# Patient Record
Sex: FEMALE | Race: ASIAN
[De-identification: names, ages, dates, MRNs, and addresses within clinical notes are randomized per-mention and may not be internally consistent; named-entity substitution may affect disease eponyms.]

---

## 2017-05-15 ENCOUNTER — HOSPITAL ENCOUNTER (OUTPATIENT)
Dept: HOSPITAL 75 - RAD | Age: 52
End: 2017-05-15
Attending: INTERNAL MEDICINE
Payer: COMMERCIAL

## 2017-05-15 DIAGNOSIS — R92.8: Primary | ICD-10-CM

## 2017-05-15 PROCEDURE — 77066 DX MAMMO INCL CAD BI: CPT

## 2017-05-15 NOTE — DIAGNOSTIC IMAGING REPORT
Bilateral diagnostic mammogram.



The current study was also evaluated with a Computer Aided

Detection (CAD) system.



INDICATION: Follow-up, right breast calcifications.



COMPARISON: 10/19/2016.



FINDINGS: The calcifications in the upper-outer aspect of the

right breast appear to be questionably increased from the prior

exam. This could be related to better visualization of portion of

these calcifications and not definitely related to worsening.



Otherwise, the heterogeneously dense parenchyma which may

decrease mammographic sensitivity is again noted in both breasts

with no adverse development.



IMPRESSION: Questionable increase in the calcification group in

the upper-outer aspect of the right breast. Close follow-up with

six-month right breast mammogram is recommended.



ACR BI-RADS Category 3: Probably benign findings.

Result letter will be mailed to the patient.

Note: At least 10% of breast cancer is not imaged by mammography.



Dictated by: 



  Dictated on workstation # [id number]

## 2017-11-27 ENCOUNTER — HOSPITAL ENCOUNTER (OUTPATIENT)
Dept: HOSPITAL 75 - RAD | Age: 52
End: 2017-11-27
Attending: NURSE PRACTITIONER
Payer: COMMERCIAL

## 2017-11-27 DIAGNOSIS — R92.1: Primary | ICD-10-CM

## 2017-11-27 NOTE — DIAGNOSTIC IMAGING REPORT
Right breast diagnostic mammogram with tomography evaluation.



The current study was also evaluated with a Computer Aided

Detection (CAD) system.



COMPARISON: 05/15/2017.



INDICATION: Right breast calcifications, six month followup.



FINDINGS:

The previously seen upper-outer anterior breast calcifications

are not changed from the previous exam with no adverse

development.



Background dense parenchyma is seen. No suspicious change from

the prior studies is noted. Circumscribed stable nodule in the

medial inferior aspect of the right breast is again noted.



IMPRESSION: Stable calcifications in the upper-outer aspect of

the right breast, likely benign. Another followup in six months

when the patient is due for her bilateral mammograms is

recommended.



ACR BI-RADS Category 3: Probably benign findings.

Result letter will be mailed to the patient.

Note: At least 10% of breast cancer is not imaged by mammography.



Dictated by: 



  Dictated on workstation # [id number]

## 2018-05-10 ENCOUNTER — HOSPITAL ENCOUNTER (OUTPATIENT)
Dept: HOSPITAL 75 - RAD | Age: 53
End: 2018-05-10
Attending: INTERNAL MEDICINE
Payer: COMMERCIAL

## 2018-05-10 DIAGNOSIS — R92.1: Primary | ICD-10-CM

## 2018-05-10 PROCEDURE — 77066 DX MAMMO INCL CAD BI: CPT

## 2018-05-10 NOTE — DIAGNOSTIC IMAGING REPORT
INDICATION: Digital mammogram bilateral diagnostic with 3-D

tomosynthesis.



This study was compared to the prior exams of 11/27/17, 5/15/17,

10/19/16 and 3/23/16. At this time, there are no current

complaints.



The current study was also evaluated with a Computer Aided

Detection (CAD) system.



FINDINGS: The previous diagnostic mammogram of the right breast

performed on 11/27/17 noted stable calcifications in the

upper-outer aspect of the right breast. Those calcifications are

again evident on this study and do not seem to have changed

significantly. I do suspect that these calcifications are benign.

As they have not changed significantly since the 10/19/16 exam, I

do feel it is safe for the patient to return to a regular

screening mammogram schedule.



The fibroglandular tissue in both breasts is dense. This does

limit the sensitivity of this study. Overall, there does not

appear to have been any adverse change. There is no primary or

secondary sign of malignancy noted.



IMPRESSION:

1. The calcifications in the right breast appear stable. Most

likely, they are benign.

2. There is no evidence of malignancy involving either breast.

3. The patient should have her annual bilateral screening

mammogram on schedule in May 2019.



ACR BI-RADS Category 2: Benign findings.

Result letter will be mailed to the patient.

Note: At least 10% of breast cancer is not imaged by mammography.



Dictated by: 



  Dictated on workstation # [id number]

## 2018-11-09 ENCOUNTER — HOSPITAL ENCOUNTER (OUTPATIENT)
Dept: HOSPITAL 75 - LAB | Age: 53
End: 2018-11-09
Attending: INTERNAL MEDICINE
Payer: COMMERCIAL

## 2018-11-09 DIAGNOSIS — B48.8: Primary | ICD-10-CM

## 2018-11-09 PROCEDURE — 87220 TISSUE EXAM FOR FUNGI: CPT

## 2019-07-26 ENCOUNTER — HOSPITAL ENCOUNTER (OUTPATIENT)
Dept: HOSPITAL 75 - RAD | Age: 54
End: 2019-07-26
Attending: INTERNAL MEDICINE
Payer: COMMERCIAL

## 2019-07-26 DIAGNOSIS — D68.51: ICD-10-CM

## 2019-07-26 DIAGNOSIS — I10: ICD-10-CM

## 2019-07-26 DIAGNOSIS — R73.09: ICD-10-CM

## 2019-07-26 DIAGNOSIS — E03.8: ICD-10-CM

## 2019-07-26 DIAGNOSIS — Z12.31: Primary | ICD-10-CM

## 2019-07-26 DIAGNOSIS — E53.8: ICD-10-CM

## 2019-07-26 PROCEDURE — 77067 SCR MAMMO BI INCL CAD: CPT

## 2019-07-26 NOTE — DIAGNOSTIC IMAGING REPORT
INDICATION: Screening



The current study was also evaluated with a Computer Aided

Detection (CAD) system. 3-D Tomographic imaging was also

performed.



COMPARISON: Comparison is made with prior examinations of

05/10/2018, 11/27/2017, and 05/15/2017.



FINDINGS: A few benign-type calcifications. There is no dominant

mass, spiculated lesion, or suspicious calcification identified.

The skin, nipples, and axillae are unremarkable.



IMPRESSION: Benign.



ACR BI-RADS Category 2: Benign findings.

Result letter will be mailed to the patient.

Note: At least 10% of breast cancer is not imaged by mammography.



Dictated by: 



  Dictated on workstation # [id number]

## 2020-08-27 ENCOUNTER — HOSPITAL ENCOUNTER (OUTPATIENT)
Dept: HOSPITAL 75 - RAD | Age: 55
End: 2020-08-27
Attending: INTERNAL MEDICINE
Payer: COMMERCIAL

## 2020-08-27 DIAGNOSIS — Z12.31: Primary | ICD-10-CM

## 2020-08-27 PROCEDURE — 77063 BREAST TOMOSYNTHESIS BI: CPT

## 2020-08-27 PROCEDURE — 77067 SCR MAMMO BI INCL CAD: CPT

## 2020-08-28 NOTE — DIAGNOSTIC IMAGING REPORT
INDICATION: Routine screening.



COMPARISON is made with prior mammograms from 7/26/2019 and

5/10/2018.



2-D and 3-D bilateral screening mammography was performed with

CAD.



Both breasts are heterogeneously dense, limiting the sensitivity

of mammography. Nodular density in the lower right breast is

stable. No new mass or malignant appearing microcalcifications

are seen. Axillae are unremarkable.



IMPRESSION: BI-RADS Category 2.



No mammographic features suspicious for malignancy are

identified.



ACR BI-RADS Category 2: Benign findings.

Result letter will be mailed to the patient.

Note: At least 10% of breast cancer is not imaged by mammography.



Dictated by: 



  Dictated on workstation # [id number]

## 2021-09-03 ENCOUNTER — HOSPITAL ENCOUNTER (OUTPATIENT)
Dept: HOSPITAL 75 - RAD | Age: 56
End: 2021-09-03
Attending: NURSE PRACTITIONER
Payer: COMMERCIAL

## 2021-09-03 DIAGNOSIS — Z12.31: Primary | ICD-10-CM

## 2021-09-03 PROCEDURE — 77067 SCR MAMMO BI INCL CAD: CPT

## 2021-09-03 PROCEDURE — 77063 BREAST TOMOSYNTHESIS BI: CPT

## 2021-09-03 NOTE — DIAGNOSTIC IMAGING REPORT
INDICATION: Routine screening.



COMPARISON is made with prior mammograms 08/27/2020 and

7/26/2019.



2-D and 3-D bilateral screening mammography was performed with

CAD.



Both breast are heterogeneously dense, limiting the sensitivity

of mammography. A nodular density in the medial right breast

appears stable. There are benign calcifications. No new mass or

malignant-appearing microcalcifications are seen. Axillae are

unremarkable.



IMPRESSION: BI-RADS Category 2



No mammographic features suspicious for malignancy are

identified.



ACR BI-RADS Category 2: Benign findings.

Result letter will be mailed to the patient.

Note: At least 10% of breast cancer is not imaged by mammography.



Dictated by: 



  Dictated on workstation # [id number]

## 2023-02-24 ENCOUNTER — HOSPITAL ENCOUNTER (OUTPATIENT)
Dept: HOSPITAL 75 - RAD | Age: 58
End: 2023-02-24
Attending: INTERNAL MEDICINE
Payer: COMMERCIAL

## 2023-02-24 DIAGNOSIS — Z12.31: Primary | ICD-10-CM

## 2023-02-24 PROCEDURE — 77067 SCR MAMMO BI INCL CAD: CPT

## 2023-02-24 PROCEDURE — 77063 BREAST TOMOSYNTHESIS BI: CPT

## 2023-02-27 NOTE — DIAGNOSTIC IMAGING REPORT
INDICATION: 

Routine screening.



COMPARISON: 

09/03/2021 and 08/27/2020.



TECHNIQUE: 

2D and 3D bilateral screening mammography was performed with CAD.



FINDINGS:

Both breasts are heterogeneously dense, limiting the sensitivity

of mammography. The medial right breast density is stable. No

other masses are detected. No malignant-appearing

microcalcifications are seen. The axillae are unremarkable.



IMPRESSION: 

No mammographic features suspicious for malignancy are

identified.



ACR BI-RADS Category 2: Benign findings.

Result letter will be mailed to the patient.

Note: At least 10% of breast cancer is not imaged by mammography.



Dictated by: 



  Dictated on workstation # [id number]